# Patient Record
Sex: MALE | URBAN - METROPOLITAN AREA
[De-identification: names, ages, dates, MRNs, and addresses within clinical notes are randomized per-mention and may not be internally consistent; named-entity substitution may affect disease eponyms.]

---

## 2019-11-11 ENCOUNTER — IMPORTED ENCOUNTER (OUTPATIENT)
Dept: URBAN - METROPOLITAN AREA CLINIC 50 | Facility: CLINIC | Age: 69
End: 2019-11-11

## 2021-09-16 NOTE — PATIENT DISCUSSION
Patient advised of the right to post-operative care by the surgeon. Patient is fully informed of, and agreed to, co-management with their primary optometric physician. Post-operative care by the surgeon is not medically necessary and co-management is clinically appropriate. Patient has received itemization of fees related to cataract surgery. Transfer of care letter completed for the patient. Transfer care of left eye to Dr. Valery Cordero on 09/16/21. Patient instructed to call immediately if any new distortion, blurring, decreased vision or eye pain.

## 2021-09-23 NOTE — PATIENT DISCUSSION
Patient advised of the right to post-operative care by the surgeon. Patient is fully informed of, and agreed to, co-management with their primary optometric physician. Post-operative care by the surgeon is not medically necessary and co-management is clinically appropriate. Patient has received itemization of fees related to cataract surgery. Transfer of care letter completed for the patient. Transfer care of right eye to Dr. Patrice Davila on 09/23/21. Patient instructed to call immediately if any new distortion, blurring, decreased vision or eye pain.

## 2024-06-23 NOTE — PATIENT DISCUSSION
Artificial Tears: One drop to both eyes 3-4 times daily. We recommend Systane or Refresh lubricating eye drops which can be found at any pharmacy.
H&P essentially unchanged.
Monitor.
No ADV due to sjogrens and mild drusen. CV ok.
No Van, + PMN,Topical, endo.
Patient is cleared for anesthesia.
Pt elects CV sara OU, toric OS. Pt aware of need for gls at all near/intermediate focal points.
Pt elects KOJO ruiz.
Reassess after #1 for CV sara.
The patient feels that the cataract is significantly impacting daily activities and has elected cataract surgery. The risks, benefits, and alternatives to surgery were discussed. The patient elects to proceed with surgery.
The types of intraocular lenses were reviewed with the patient along with a discussion of their various strengths and weaknesses.
23-Jun-2024 08:49